# Patient Record
Sex: FEMALE | Race: WHITE | URBAN - METROPOLITAN AREA
[De-identification: names, ages, dates, MRNs, and addresses within clinical notes are randomized per-mention and may not be internally consistent; named-entity substitution may affect disease eponyms.]

---

## 2021-08-30 ENCOUNTER — TELEPHONE ENCOUNTER (OUTPATIENT)
Dept: URBAN - METROPOLITAN AREA CLINIC 23 | Facility: CLINIC | Age: 56
End: 2021-08-30

## 2021-08-30 RX ORDER — PLECANATIDE 3 MG/1
1 TABLET TABLET ORAL ONCE A DAY
Qty: 90 | Refills: 3
Start: 2021-08-30 | End: 2022-08-25

## 2022-07-05 ENCOUNTER — LAB OUTSIDE AN ENCOUNTER (OUTPATIENT)
Dept: URBAN - METROPOLITAN AREA CLINIC 23 | Facility: CLINIC | Age: 57
End: 2022-07-05

## 2022-07-05 ENCOUNTER — WEB ENCOUNTER (OUTPATIENT)
Dept: URBAN - METROPOLITAN AREA CLINIC 23 | Facility: CLINIC | Age: 57
End: 2022-07-05

## 2022-07-05 ENCOUNTER — DASHBOARD ENCOUNTERS (OUTPATIENT)
Age: 57
End: 2022-07-05

## 2022-07-05 ENCOUNTER — OFFICE VISIT (OUTPATIENT)
Dept: URBAN - METROPOLITAN AREA CLINIC 23 | Facility: CLINIC | Age: 57
End: 2022-07-05
Payer: COMMERCIAL

## 2022-07-05 VITALS
SYSTOLIC BLOOD PRESSURE: 129 MMHG | BODY MASS INDEX: 26.36 KG/M2 | DIASTOLIC BLOOD PRESSURE: 80 MMHG | WEIGHT: 164 LBS | HEART RATE: 87 BPM | TEMPERATURE: 97.6 F | HEIGHT: 66 IN

## 2022-07-05 DIAGNOSIS — K31.84 GASTROPARESIS: ICD-10-CM

## 2022-07-05 DIAGNOSIS — Z86.010 HISTORY OF COLON POLYPS: ICD-10-CM

## 2022-07-05 DIAGNOSIS — K59.01 CONSTIPATION: ICD-10-CM

## 2022-07-05 PROCEDURE — 99203 OFFICE O/P NEW LOW 30 MIN: CPT | Performed by: INTERNAL MEDICINE

## 2022-07-05 RX ORDER — PLECANATIDE 3 MG/1
1 TABLET TABLET ORAL ONCE A DAY
Qty: 90 | Refills: 3 | Status: ACTIVE | COMMUNITY
Start: 2021-08-30 | End: 2022-08-25

## 2022-07-05 RX ORDER — ATORVASTATIN CALCIUM 10 MG/1
TABLET, FILM COATED ORAL
Qty: 0 | Refills: 0 | Status: ACTIVE | COMMUNITY
Start: 1900-01-01

## 2022-07-05 RX ORDER — PANTOPRAZOLE SODIUM 40 MG
TABLET, DELAYED RELEASE (ENTERIC COATED) ORAL
Qty: 0 | Refills: 0 | Status: ACTIVE | COMMUNITY
Start: 1900-01-01

## 2022-07-05 RX ORDER — ASPIRIN 81 MG/1
TABLET, COATED ORAL
Qty: 0 | Refills: 0 | Status: ACTIVE | COMMUNITY
Start: 1900-01-01

## 2022-07-05 RX ORDER — LOSARTAN POTASSIUM 25 MG/1
TABLET, FILM COATED ORAL
Qty: 0 | Refills: 0 | Status: ACTIVE | COMMUNITY
Start: 1900-01-01

## 2022-07-05 RX ORDER — ESTRADIOL 0.05 MG/D
PATCH TRANSDERMAL
Qty: 0 | Refills: 0 | Status: ACTIVE | COMMUNITY
Start: 1900-01-01

## 2022-07-05 NOTE — HPI-TODAY'S VISIT:
56-year-old female presented today to schedule her surveillance colonoscopy.  She had colonoscopy in 2019 revealed large 2 cm tubulovillous adenoma.  She had history of chronic constipation she is taking Trulance which helped her symptoms very well.  She has history of gastroparesis currently diet controlled no vomiting no abdominal pain overall she is doing well since I saw her in 2019 her diabetes better controlled she is on insulin

## 2022-07-09 PROBLEM — 14760008 CONSTIPATION: Status: ACTIVE | Noted: 2022-07-05

## 2022-08-05 PROBLEM — 428283002 HISTORY OF POLYP OF COLON: Status: ACTIVE | Noted: 2022-07-05

## 2022-08-26 ENCOUNTER — ERX REFILL RESPONSE (OUTPATIENT)
Dept: URBAN - METROPOLITAN AREA CLINIC 23 | Facility: CLINIC | Age: 57
End: 2022-08-26

## 2022-08-26 RX ORDER — PLECANATIDE 3 MG/1
TAKE ONE TABLET BY MOUTH ONCE DAILY TABLET ORAL
Qty: 90 TABLET | Refills: 0 | OUTPATIENT

## 2022-08-26 RX ORDER — PLECANATIDE 3 MG/1
TAKE ONE TABLET BY MOUTH ONCE DAILY TABLET ORAL
Qty: 90 TABLET | Refills: 1 | OUTPATIENT

## 2022-09-21 ENCOUNTER — OFFICE VISIT (OUTPATIENT)
Dept: URBAN - METROPOLITAN AREA LAB 3 | Facility: LAB | Age: 57
End: 2022-09-21
Payer: COMMERCIAL

## 2022-09-21 ENCOUNTER — LAB OUTSIDE AN ENCOUNTER (OUTPATIENT)
Dept: URBAN - METROPOLITAN AREA CLINIC 23 | Facility: CLINIC | Age: 57
End: 2022-09-21

## 2022-09-21 DIAGNOSIS — D12.2 ADENOMA OF ASCENDING COLON: ICD-10-CM

## 2022-09-21 DIAGNOSIS — Z86.010 ADENOMAS PERSONAL HISTORY OF COLONIC POLYPS: ICD-10-CM

## 2022-09-21 LAB
GLUCOSE POC: 104
PERFORMING LAB: (no result)

## 2022-09-21 PROCEDURE — 45380 COLONOSCOPY AND BIOPSY: CPT | Performed by: INTERNAL MEDICINE

## 2022-09-21 RX ORDER — LOSARTAN POTASSIUM 25 MG/1
TABLET, FILM COATED ORAL
Qty: 0 | Refills: 0 | Status: ACTIVE | COMMUNITY
Start: 1900-01-01

## 2022-09-21 RX ORDER — PANTOPRAZOLE SODIUM 40 MG
TABLET, DELAYED RELEASE (ENTERIC COATED) ORAL
Qty: 0 | Refills: 0 | Status: ACTIVE | COMMUNITY
Start: 1900-01-01

## 2022-09-21 RX ORDER — ATORVASTATIN CALCIUM 10 MG/1
TABLET, FILM COATED ORAL
Qty: 0 | Refills: 0 | Status: ACTIVE | COMMUNITY
Start: 1900-01-01

## 2022-09-21 RX ORDER — PLECANATIDE 3 MG/1
TAKE ONE TABLET BY MOUTH ONCE DAILY TABLET ORAL
Qty: 90 TABLET | Refills: 0 | Status: ACTIVE | COMMUNITY

## 2022-09-21 RX ORDER — ESTRADIOL 0.05 MG/D
PATCH TRANSDERMAL
Qty: 0 | Refills: 0 | Status: ACTIVE | COMMUNITY
Start: 1900-01-01

## 2022-09-21 RX ORDER — ASPIRIN 81 MG/1
TABLET, COATED ORAL
Qty: 0 | Refills: 0 | Status: ACTIVE | COMMUNITY
Start: 1900-01-01

## 2022-09-27 ENCOUNTER — WEB ENCOUNTER (OUTPATIENT)
Dept: URBAN - METROPOLITAN AREA CLINIC 23 | Facility: CLINIC | Age: 57
End: 2022-09-27

## 2022-11-29 ENCOUNTER — ERX REFILL RESPONSE (OUTPATIENT)
Dept: URBAN - METROPOLITAN AREA CLINIC 23 | Facility: CLINIC | Age: 57
End: 2022-11-29

## 2022-11-29 RX ORDER — PLECANATIDE 3 MG/1
TAKE ONE TABLET BY MOUTH ONCE DAILY TABLET ORAL
Qty: 90 TABLET | Refills: 0 | OUTPATIENT

## 2023-10-25 ENCOUNTER — ERX REFILL RESPONSE (OUTPATIENT)
Dept: URBAN - METROPOLITAN AREA CLINIC 23 | Facility: CLINIC | Age: 58
End: 2023-10-25

## 2023-10-25 RX ORDER — PLECANATIDE 3 MG/1
TAKE ONE TABLET BY MOUTH ONCE DAILY TABLET ORAL
Qty: 90 TABLET | Refills: 0 | OUTPATIENT

## 2024-10-23 ENCOUNTER — ERX REFILL RESPONSE (OUTPATIENT)
Dept: URBAN - METROPOLITAN AREA CLINIC 23 | Facility: CLINIC | Age: 59
End: 2024-10-23

## 2024-10-23 RX ORDER — PLECANATIDE 3 MG/1
TAKE ONE TABLET BY MOUTH ONCE DAILY TABLET ORAL
Qty: 30 TABLET | Refills: 0 | OUTPATIENT

## 2024-10-23 RX ORDER — PLECANATIDE 3 MG/1
TAKE ONE TABLET BY MOUTH ONCE DAILY TABLET ORAL
Qty: 90 TABLET | Refills: 0 | OUTPATIENT

## 2024-12-24 ENCOUNTER — OFFICE VISIT (OUTPATIENT)
Dept: URBAN - METROPOLITAN AREA CLINIC 12 | Facility: CLINIC | Age: 59
End: 2024-12-24

## 2024-12-27 ENCOUNTER — OFFICE VISIT (OUTPATIENT)
Dept: URBAN - METROPOLITAN AREA CLINIC 12 | Facility: CLINIC | Age: 59
End: 2024-12-27
Payer: COMMERCIAL

## 2024-12-27 VITALS
HEART RATE: 76 BPM | DIASTOLIC BLOOD PRESSURE: 82 MMHG | TEMPERATURE: 97.7 F | HEIGHT: 66 IN | SYSTOLIC BLOOD PRESSURE: 144 MMHG | BODY MASS INDEX: 29.15 KG/M2 | WEIGHT: 181.4 LBS

## 2024-12-27 DIAGNOSIS — K59.01 CONSTIPATION: ICD-10-CM

## 2024-12-27 DIAGNOSIS — K31.84 GASTROPARESIS: ICD-10-CM

## 2024-12-27 DIAGNOSIS — Z86.0101 HISTORY OF ADENOMATOUS POLYP OF COLON: ICD-10-CM

## 2024-12-27 PROBLEM — 429047008: Status: ACTIVE | Noted: 2024-12-27

## 2024-12-27 PROCEDURE — 99213 OFFICE O/P EST LOW 20 MIN: CPT

## 2024-12-27 RX ORDER — ESTRADIOL 0.05 MG/D
PATCH TRANSDERMAL
Qty: 0 | Refills: 0 | Status: ACTIVE | COMMUNITY
Start: 1900-01-01

## 2024-12-27 RX ORDER — ASPIRIN 81 MG/1
TABLET, COATED ORAL
Qty: 0 | Refills: 0 | Status: ACTIVE | COMMUNITY
Start: 1900-01-01

## 2024-12-27 RX ORDER — PLECANATIDE 3 MG/1
TAKE ONE TABLET BY MOUTH ONCE DAILY TABLET ORAL ONCE A DAY
Qty: 30 | Refills: 5

## 2024-12-27 RX ORDER — LOSARTAN POTASSIUM 25 MG/1
TABLET, FILM COATED ORAL
Qty: 0 | Refills: 0 | Status: ACTIVE | COMMUNITY
Start: 1900-01-01

## 2024-12-27 RX ORDER — PLECANATIDE 3 MG/1
TAKE ONE TABLET BY MOUTH ONCE DAILY TABLET ORAL
Qty: 30 TABLET | Refills: 0 | Status: ACTIVE | COMMUNITY

## 2024-12-27 RX ORDER — ATORVASTATIN CALCIUM 10 MG/1
TABLET, FILM COATED ORAL
Qty: 0 | Refills: 0 | Status: ACTIVE | COMMUNITY
Start: 1900-01-01

## 2024-12-27 RX ORDER — PANTOPRAZOLE SODIUM 40 MG
TABLET, DELAYED RELEASE (ENTERIC COATED) ORAL
Qty: 0 | Refills: 0 | Status: ACTIVE | COMMUNITY
Start: 1900-01-01

## 2024-12-27 NOTE — HPI-TODAY'S VISIT:
Pt is a 60 yo female with hx of chronic constipation presents today for a refill on Trulance.  Reports taking Trulance only 2-3 times a week d/t cost of medication ($80 per month) but it works well.  Has BM 2-3 times a week after taking Trulance.  She states she has tried "everything" and Trulance has been the most effective tx.  Denies abd pain, rectal bleeding, rectal pain or any other symptoms today.   Her last colonoscopy was on 9/2022: 1 TA in ascending colon, IH. Repeat in 5 years advised.

## 2024-12-30 ENCOUNTER — TELEPHONE ENCOUNTER (OUTPATIENT)
Dept: URBAN - METROPOLITAN AREA CLINIC 23 | Facility: CLINIC | Age: 59
End: 2024-12-30

## 2025-01-04 ENCOUNTER — WEB ENCOUNTER (OUTPATIENT)
Dept: URBAN - METROPOLITAN AREA CLINIC 12 | Facility: CLINIC | Age: 60
End: 2025-01-04

## 2025-08-12 ENCOUNTER — WEB ENCOUNTER (OUTPATIENT)
Dept: URBAN - METROPOLITAN AREA CLINIC 12 | Facility: CLINIC | Age: 60
End: 2025-08-12

## 2025-08-12 RX ORDER — PLECANATIDE 3 MG/1
TAKE ONE TABLET BY MOUTH ONCE DAILY TABLET ORAL ONCE A DAY
Qty: 90 TABLET | Refills: 0

## 2025-08-21 ENCOUNTER — WEB ENCOUNTER (OUTPATIENT)
Dept: URBAN - METROPOLITAN AREA CLINIC 23 | Facility: CLINIC | Age: 60
End: 2025-08-21

## 2025-08-21 RX ORDER — PLECANATIDE 3 MG/1
TAKE ONE TABLET BY MOUTH ONCE DAILY TABLET ORAL ONCE A DAY
Qty: 90 TABLET | Refills: 0
End: 2025-11-19